# Patient Record
Sex: FEMALE | Race: BLACK OR AFRICAN AMERICAN | NOT HISPANIC OR LATINO | Employment: UNEMPLOYED | URBAN - METROPOLITAN AREA
[De-identification: names, ages, dates, MRNs, and addresses within clinical notes are randomized per-mention and may not be internally consistent; named-entity substitution may affect disease eponyms.]

---

## 2022-06-22 PROBLEM — E87.6 HYPOKALEMIA: Status: ACTIVE | Noted: 2022-06-22

## 2022-06-22 PROBLEM — U07.1 COVID: Status: ACTIVE | Noted: 2022-06-22

## 2022-06-22 PROBLEM — D61.818 PANCYTOPENIA: Status: ACTIVE | Noted: 2022-06-22

## 2022-06-22 PROBLEM — D70.9 NEUTROPENIA: Status: ACTIVE | Noted: 2022-06-22

## 2022-08-18 PROBLEM — D50.0 IRON DEFICIENCY ANEMIA DUE TO CHRONIC BLOOD LOSS: Status: ACTIVE | Noted: 2022-08-18

## 2023-01-26 PROBLEM — F41.9 ANXIETY: Status: ACTIVE | Noted: 2023-01-26

## 2023-01-26 PROBLEM — Z87.828 HISTORY OF GUNSHOT WOUND: Status: ACTIVE | Noted: 2023-01-26

## 2023-01-26 PROBLEM — N63.22 MASS OF UPPER INNER QUADRANT OF LEFT BREAST: Status: ACTIVE | Noted: 2023-01-26

## 2023-01-26 PROBLEM — N94.6 DYSMENORRHEA: Status: ACTIVE | Noted: 2023-01-26

## 2023-01-26 PROBLEM — N92.0 MENORRHAGIA: Status: ACTIVE | Noted: 2023-01-26

## 2023-01-26 PROBLEM — F32.A DEPRESSION: Status: ACTIVE | Noted: 2023-01-26

## 2023-02-15 ENCOUNTER — SOCIAL WORK (OUTPATIENT)
Dept: ADMINISTRATIVE | Facility: OTHER | Age: 43
End: 2023-02-15

## 2023-02-15 NOTE — PROGRESS NOTES
[facility] received a referral to assist with counseling. [facility] received a consult to see Patient. After reviewing the referral, it was recommended her PCP continue to manage her mental health medicine. [facility] mailed Patient a list of counselors. She was encouraged to contact one to schedule an assessment if she was still in need of services.    Adrienne Aleman LCSW    [phone]

## 2024-02-13 PROBLEM — N20.1 URETERAL STONE: Status: ACTIVE | Noted: 2024-02-13

## 2024-02-15 ENCOUNTER — PATIENT MESSAGE (OUTPATIENT)
Dept: URGENT CARE | Facility: CLINIC | Age: 44
End: 2024-02-15

## 2025-08-02 PROBLEM — N20.0 NEPHROLITHIASIS: Status: ACTIVE | Noted: 2025-08-02

## 2025-08-02 PROBLEM — K59.00 CONSTIPATION: Status: ACTIVE | Noted: 2025-08-02

## 2025-08-02 PROBLEM — D21.9 FIBROIDS: Status: ACTIVE | Noted: 2025-08-02

## 2025-09-04 ENCOUNTER — PATIENT OUTREACH (OUTPATIENT)
Dept: ADMINISTRATIVE | Facility: HOSPITAL | Age: 45
End: 2025-09-04